# Patient Record
Sex: MALE | Race: WHITE | NOT HISPANIC OR LATINO
[De-identification: names, ages, dates, MRNs, and addresses within clinical notes are randomized per-mention and may not be internally consistent; named-entity substitution may affect disease eponyms.]

---

## 2022-04-18 ENCOUNTER — NON-APPOINTMENT (OUTPATIENT)
Age: 37
End: 2022-04-18

## 2022-04-18 ENCOUNTER — APPOINTMENT (OUTPATIENT)
Dept: CARDIOLOGY | Facility: CLINIC | Age: 37
End: 2022-04-18
Payer: COMMERCIAL

## 2022-04-18 VITALS
SYSTOLIC BLOOD PRESSURE: 133 MMHG | BODY MASS INDEX: 27.13 KG/M2 | HEIGHT: 68 IN | DIASTOLIC BLOOD PRESSURE: 70 MMHG | WEIGHT: 179 LBS

## 2022-04-18 DIAGNOSIS — I48.91 UNSPECIFIED ATRIAL FIBRILLATION: ICD-10-CM

## 2022-04-18 PROBLEM — Z00.00 ENCOUNTER FOR PREVENTIVE HEALTH EXAMINATION: Status: ACTIVE | Noted: 2022-04-18

## 2022-04-18 PROCEDURE — 99204 OFFICE O/P NEW MOD 45 MIN: CPT

## 2022-04-18 PROCEDURE — 93000 ELECTROCARDIOGRAM COMPLETE: CPT

## 2022-04-18 RX ORDER — APIXABAN 5 MG/1
5 TABLET, FILM COATED ORAL
Refills: 0 | Status: ACTIVE | COMMUNITY

## 2022-04-18 NOTE — REASON FOR VISIT
[FreeTextEntry1] : The patient was referred from the [place] emergency room.  He presented there 2 days ago with an irregular heartbeat which was found to be atrial fibrillation with controlled ventricular rate.  There were no precipitating factors.  The patient has enjoyed excellent health and has had no previous history of cardiac arrhythmias.\par The arrhythmia has resolved after discharge from the emergency room.  Patient was temporarily placed on Eliquis anticoagulation.\par The patient has been in good health.  He has had no hospitalizations.  He is able to do regular exercise including marathon running without difficulty.  He is currently training for the marathon\par \par

## 2022-04-18 NOTE — DISCUSSION/SUMMARY
[FreeTextEntry1] : The patient had an episode of documented atrial fibrillation recently at [place].  This episode is idiopathic and there is no evidence of significant underlying heart disease.  His examination today is normal his electrocardiogram is normal.  I am asking him to discontinue Eliquis at this time and we will bring the patient back to the office for echocardiography.  Patient is free to go about his usual exercise and activities.

## 2022-05-19 ENCOUNTER — NON-APPOINTMENT (OUTPATIENT)
Age: 37
End: 2022-05-19

## 2022-05-19 ENCOUNTER — APPOINTMENT (OUTPATIENT)
Dept: CARDIOLOGY | Facility: CLINIC | Age: 37
End: 2022-05-19
Payer: COMMERCIAL

## 2022-05-19 PROCEDURE — 93306 TTE W/DOPPLER COMPLETE: CPT
